# Patient Record
Sex: MALE | Race: BLACK OR AFRICAN AMERICAN | NOT HISPANIC OR LATINO | Employment: FULL TIME | URBAN - METROPOLITAN AREA
[De-identification: names, ages, dates, MRNs, and addresses within clinical notes are randomized per-mention and may not be internally consistent; named-entity substitution may affect disease eponyms.]

---

## 2019-06-02 ENCOUNTER — OFFICE VISIT (OUTPATIENT)
Dept: OPHTHALMOLOGY | Facility: CLINIC | Age: 39
End: 2019-06-02
Payer: COMMERCIAL

## 2019-06-02 DIAGNOSIS — H11.422 CHEMOSIS OF CONJUNCTIVA, LEFT: ICD-10-CM

## 2019-06-02 DIAGNOSIS — H20.9 TRAUMATIC IRITIS: ICD-10-CM

## 2019-06-02 DIAGNOSIS — H40.52X1 SECONDARY GLAUCOMA OF LEFT EYE, MILD STAGE: ICD-10-CM

## 2019-06-02 DIAGNOSIS — S05.12XA TRAUMATIC ORBITAL HEMATOMA, LEFT, INITIAL ENCOUNTER: Primary | ICD-10-CM

## 2019-06-02 PROCEDURE — 92004 COMPRE OPH EXAM NEW PT 1/>: CPT | Performed by: OPHTHALMOLOGY
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
## 2019-06-04 ENCOUNTER — OFFICE VISIT (OUTPATIENT)
Dept: OPHTHALMOLOGY | Facility: CLINIC | Age: 39
End: 2019-06-04
Payer: COMMERCIAL

## 2019-06-04 DIAGNOSIS — S05.12XD: Primary | ICD-10-CM

## 2019-06-04 PROCEDURE — 92012 INTRM OPH EXAM EST PATIENT: CPT | Performed by: OPHTHALMOLOGY

## 2019-06-04 RX ORDER — PREDNISOLONE ACETATE 10 MG/ML
1 SUSPENSION/ DROPS OPHTHALMIC 4 TIMES DAILY
COMMUNITY
Start: 2019-06-02 | End: 2019-06-18

## 2019-06-04 RX ORDER — ACETAZOLAMIDE 250 MG/1
250 TABLET ORAL 3 TIMES DAILY
COMMUNITY
Start: 2019-06-01 | End: 2019-06-10

## 2019-06-04 RX ORDER — DORZOLAMIDE HYDROCHLORIDE AND TIMOLOL MALEATE 20; 5 MG/ML; MG/ML
1 SOLUTION/ DROPS OPHTHALMIC 2 TIMES DAILY
COMMUNITY
Start: 2019-06-01 | End: 2019-06-18

## 2019-06-04 RX ORDER — BRIMONIDINE TARTRATE 1.5 MG/ML
1 SOLUTION/ DROPS OPHTHALMIC 2 TIMES DAILY
COMMUNITY
Start: 2019-06-01 | End: 2019-06-18

## 2019-06-04 ASSESSMENT — VISUAL ACUITY
METHOD: SNELLEN - LINEAR
OD_SC: 20/30
CORRECTION_TYPE: GLASSES
OS_SC: 20/50

## 2019-06-04 ASSESSMENT — TONOMETRY
OS_IOP_MMHG: 15
IOP_METHOD: APPLANATION

## 2019-06-04 ASSESSMENT — CUP TO DISC RATIO: OS_RATIO: 0.6
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
## 2019-06-04 NOTE — PATIENT INSTRUCTIONS
Discontinue Diamox pills.    Continue Tobrex ointment to incision site and left eyelid three times daily,  Brimonidine (purple top) left eye twice daily,  Dorzolamide/Timolol (Cosopt - dark blue top) left eye twice daily,  Homatropine left eye three times daily,   And Prednisolone left eye only four times daily.  Wait at least 5 minutes between drops if using more than one at a time.  We will contact our Occuloplastics Surgeon and discuss follow up care - will call with this information.  Driving is ok, but be careful as your depth perception will be off during healing.   Use cold packs on the eyes as needed.  Return visit next Monday 6/10/19 for MD check.    Negrito Brown M.D.  [phone]
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
## 2019-06-04 NOTE — Clinical Note
Devonte Faust,This patient had a lateral canthotomy performed by an ER doc at [place] for tense orbital swelling, vision loss, and elevated IOP.  His orbital swelling is resolving and I'd appreciate if you could see him in the next few days to assess for reconstruction of his canthal angle and revision of placed sutures if necessary.I told the patient I'd contact him after I hear from you.Thanks so much!Al

## 2019-06-05 ASSESSMENT — EXTERNAL EXAM - RIGHT EYE: OD_EXAM: NORMAL

## 2019-06-05 ASSESSMENT — SLIT LAMP EXAM - LIDS: COMMENTS: NORMAL

## 2019-06-10 ENCOUNTER — OFFICE VISIT (OUTPATIENT)
Dept: OPHTHALMOLOGY | Facility: CLINIC | Age: 39
End: 2019-06-10
Payer: COMMERCIAL

## 2019-06-10 DIAGNOSIS — S05.12XD: Primary | ICD-10-CM

## 2019-06-10 DIAGNOSIS — H20.9 TRAUMATIC IRITIS: ICD-10-CM

## 2019-06-10 DIAGNOSIS — H11.422 CHEMOSIS OF CONJUNCTIVA, LEFT: ICD-10-CM

## 2019-06-10 PROCEDURE — 67875 CLOSURE OF EYELID BY SUTURE: CPT | Mod: LT | Performed by: OPHTHALMOLOGY

## 2019-06-10 PROCEDURE — 92012 INTRM OPH EXAM EST PATIENT: CPT | Performed by: OPHTHALMOLOGY

## 2019-06-10 RX ORDER — ERGOCALCIFEROL 1.25 MG/1
50000 CAPSULE, LIQUID FILLED ORAL
COMMUNITY
Start: 2019-05-02 | End: 2019-07-30

## 2019-06-10 RX ORDER — CYCLOBENZAPRINE HCL 10 MG
TABLET ORAL
COMMUNITY
Start: 2018-07-09

## 2019-06-10 RX ORDER — INSULIN PUMP SYRINGE, 3 ML
EACH MISCELLANEOUS
COMMUNITY
Start: 2017-06-21

## 2019-06-10 RX ORDER — ATORVASTATIN CALCIUM 10 MG/1
10 TABLET, FILM COATED ORAL
COMMUNITY
Start: 2018-12-10

## 2019-06-10 RX ORDER — OXYCODONE AND ACETAMINOPHEN 5; 325 MG/1; MG/1
1-2 TABLET ORAL
COMMUNITY
Start: 2019-06-01

## 2019-06-10 ASSESSMENT — VISUAL ACUITY
OS_SC+: -2
OD_SC+: -1
METHOD: SNELLEN - LINEAR
OS_SC: 20/60
OS_SC+: -2
OS_SC: 20/70
METHOD: SNELLEN - LINEAR
OD_SC: 20/50
OD_SC+: -1
OD_SC: 20/50

## 2019-06-10 ASSESSMENT — TONOMETRY
OS_IOP_MMHG: 26
IOP_METHOD: ICARE
IOP_METHOD: APPLANATION
OD_IOP_MMHG: 21
OS_IOP_MMHG: 24

## 2019-06-10 ASSESSMENT — SLIT LAMP EXAM - LIDS
COMMENTS: NORMAL
COMMENTS: NORMAL

## 2019-06-10 ASSESSMENT — CUP TO DISC RATIO: OS_RATIO: 0.6

## 2019-06-10 ASSESSMENT — EXTERNAL EXAM - RIGHT EYE
OD_EXAM: NORMAL
OD_EXAM: NORMAL

## 2019-06-10 NOTE — LETTER
6/10/2019         RE: Nils Lantigua  [address] [facility] [address]        Dear Colleague,    Thank you for referring your patient, Nils Lantigua, to the [facility]. Please see a copy of my visit note below.     Current Eye Medications:  Tobrex ointment to incision site and left eyelid three times daily-(ran out two days ago),  Brimonidine left eye twice daily,  Dorzolamide/Timolol left eye twice daily,  Homatropine left eye three times daily,  Prednisolone left eye only four times daily.     Subjective:  Here for recheck. Friday the conjunctiva in the left eye started to protrude, getting worse ever since. Pain is 10/10 this am, took Oxycodone. Getting very constipated, not on any stool softener.  Has paperwork to be signed, works 64 hours in two weeks.     Objective:  See Ophthalmology Exam.       Assessment:  Worsening prolapsing conjunctiva left eye in patient with hx of traumatic injury.      Plan:  Continue using Brimonidine (purple top) left eye twice daily,  Dorzolamide/Timolol (Cosopt - dark blue top) left eye twice daily,  Homatropine left eye three times daily,  Prednisolone left eye four times daily,  Consult with Dr. Govind Torres regarding Tobrex ointment and returning to work at today's visit.  Will contact you for recommend return visit after consulting with Dr. Govind Torres.     Negrito Brown M.D.  [phone]           Again, thank you for allowing me to participate in the care of your patient.        Sincerely,        Negrito Brown MD
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
## 2019-06-10 NOTE — PATIENT INSTRUCTIONS
Continue using Brimonidine (purple top) left eye twice daily,  Dorzolamide/Timolol (Cosopt - dark blue top) left eye twice daily,  Homatropine left eye three times daily,  Prednisolone left eye four times daily,  Consult with Dr. Govind Torres regarding Tobrex ointment and returning to work at today's visit.  Will contact you for recommend return visit after consulting with Dr. Govind Torres.     Negrito Brown M.D.  [phone]

## 2019-06-10 NOTE — PROGRESS NOTES
Current Eye Medications:  Tobrex ointment to incision site and left eyelid three times daily-(ran out two days ago),  Brimonidine left eye twice daily,  Dorzolamide/Timolol left eye twice daily,  Homatropine left eye three times daily,  Prednisolone left eye only four times daily.     Subjective:  Here for recheck. Friday the conjunctiva in the left eye started to protrude, getting worse ever since. Pain is 10/10 this am, took Oxycodone. Getting very constipated, not on any stool softener.  Has paperwork to be signed, works 64 hours in two weeks.     Objective:  See Ophthalmology Exam.       Assessment:  Worsening prolapsing conjunctiva left eye in patient with hx of traumatic injury.      Plan:  Continue using Brimonidine (purple top) left eye twice daily,  Dorzolamide/Timolol (Cosopt - dark blue top) left eye twice daily,  Homatropine left eye three times daily,  Prednisolone left eye four times daily,  Consult with Dr. Govind Torres regarding Tobrex ointment and returning to work at today's visit.  Will contact you for recommend return visit after consulting with Dr. Govind Torres.     Negrito Brown M.D.  [phone]

## 2019-06-10 NOTE — Clinical Note
Devonte Faust,His chemosis has worsened since his visit last week.  He is having more pain and difficulty instilling drops and ointment.  Still has a significant iritis.Hope you can help...not sure if you need to take down sutures at this point.Thanks!Negrito

## 2019-06-17 ENCOUNTER — TELEPHONE (OUTPATIENT)
Dept: OPHTHALMOLOGY | Facility: CLINIC | Age: 39
End: 2019-06-17

## 2019-06-17 NOTE — TELEPHONE ENCOUNTER
Phone call to pt, [place] Torres [place] [facility]  Madeline Sylvester RN

## 2019-06-17 NOTE — TELEPHONE ENCOUNTER
Caller reporting the following red-flag symptom(s): Patient, Nils Lantigua     Recent surgery on left eye. Scheduled this wed for removal of stitches, but patient said stitches are painful currently and wants to be seen sooner. Advised dr darling not in office today and no other physician can see. Pt also instructed to contact the [facility] as dr darling is in office tomorrow, Tuesday, but patient reluctant to contact as he wants to be advised since its painful today.    Per the system red-flag symptom policy, patient was:  Instructed to speak with a Registered Nurse and refuses to speak with a triage nurse and wants a message sent to their provider.    Tried contacting specialty eye dept with no answer so was advised to send pool message to dept since dr darling is not working today and is not in [facility] tomorrow. Please call pt to advise.    Action:  Message sent to the clinic.

## 2019-06-18 ENCOUNTER — OFFICE VISIT (OUTPATIENT)
Dept: OPHTHALMOLOGY | Facility: CLINIC | Age: 39
End: 2019-06-18
Payer: COMMERCIAL

## 2019-06-18 DIAGNOSIS — S05.12XD: ICD-10-CM

## 2019-06-18 DIAGNOSIS — H40.052 ELEVATED IOP, LEFT: ICD-10-CM

## 2019-06-18 DIAGNOSIS — H11.422 CHEMOSIS OF CONJUNCTIVA, LEFT: Primary | ICD-10-CM

## 2019-06-18 DIAGNOSIS — H20.9 TRAUMATIC IRITIS: ICD-10-CM

## 2019-06-18 RX ORDER — BRIMONIDINE TARTRATE 1.5 MG/ML
1 SOLUTION/ DROPS OPHTHALMIC 2 TIMES DAILY
Qty: 1 BOTTLE | Refills: 3 | Status: SHIPPED | OUTPATIENT
Start: 2019-06-18

## 2019-06-18 RX ORDER — DORZOLAMIDE HYDROCHLORIDE AND TIMOLOL MALEATE 20; 5 MG/ML; MG/ML
1 SOLUTION/ DROPS OPHTHALMIC 2 TIMES DAILY
Qty: 1 BOTTLE | Refills: 3 | Status: SHIPPED | OUTPATIENT
Start: 2019-06-18

## 2019-06-18 ASSESSMENT — EXTERNAL EXAM - RIGHT EYE: OD_EXAM: NORMAL

## 2019-06-18 ASSESSMENT — VISUAL ACUITY
OS_SC+: -1
OD_SC+: +1
OS_SC: 20/30
METHOD: SNELLEN - LINEAR
OD_SC: 20/50

## 2019-06-18 ASSESSMENT — TONOMETRY
OD_IOP_MMHG: 19
OS_IOP_MMHG: XX
IOP_METHOD: ICARE

## 2019-06-18 ASSESSMENT — SLIT LAMP EXAM - LIDS: COMMENTS: NORMAL

## 2019-06-18 NOTE — NURSING NOTE
Chief Complaints and History of Present Illnesses   Patient presents with     Post Op (Ophthalmology) Left Eye     POD#8 s/p Left temporary tarsorrhaphy     Chief Complaint(s) and History of Present Illness(es)     Post Op (Ophthalmology) Left Eye     Laterality: left eye    Associated symptoms: tearing.  Negative for eye pain    Comments: POD#8 s/p Left temporary tarsorrhaphy              Comments     Patient notes that the LE he had a HA from his stitches, took tylenol for relief    Deana Ramsey June 18, 2019 9:20 AM
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
## 2019-06-18 NOTE — Clinical Note
Hi Al, he looks significantly better from a chemosis standpoint - still some left, but can easily close his eyes now. His intraocular pressure is moderately elevated (mid 30s), he didn't use his drops this morning. Will restart those drops, back off a bit on steroid (iritis looks better). I will see him back in 2 weeks to see what if anything needs to be done for his lateral canthus (my guess is nothing). I asked him to return to you for management of iritis and pressure checks. Thanks!

## 2019-06-27 ENCOUNTER — OFFICE VISIT (OUTPATIENT)
Dept: OPHTHALMOLOGY | Facility: CLINIC | Age: 39
End: 2019-06-27
Payer: COMMERCIAL

## 2019-06-27 DIAGNOSIS — S05.12XD: Primary | ICD-10-CM

## 2019-06-27 DIAGNOSIS — H20.9 TRAUMATIC IRITIS: ICD-10-CM

## 2019-06-27 DIAGNOSIS — H40.52X1 SECONDARY GLAUCOMA OF LEFT EYE, MILD STAGE: ICD-10-CM

## 2019-06-27 DIAGNOSIS — H11.422 CHEMOSIS OF CONJUNCTIVA, LEFT: ICD-10-CM

## 2019-06-27 PROCEDURE — 92012 INTRM OPH EXAM EST PATIENT: CPT | Performed by: OPHTHALMOLOGY

## 2019-06-27 RX ORDER — PREDNISOLONE ACETATE 10 MG/ML
SUSPENSION/ DROPS OPHTHALMIC
COMMUNITY
Start: 2019-06-19

## 2019-06-27 ASSESSMENT — TONOMETRY
IOP_METHOD: TONOPEN
OS_IOP_MMHG: 40

## 2019-06-27 ASSESSMENT — SLIT LAMP EXAM - LIDS: COMMENTS: NORMAL

## 2019-06-27 ASSESSMENT — EXTERNAL EXAM - RIGHT EYE: OD_EXAM: NORMAL

## 2019-06-27 ASSESSMENT — VISUAL ACUITY
OD_SC: 20/50
OS_SC: 20/30
OD_SC+: -1
METHOD: SNELLEN - LINEAR

## 2019-06-27 NOTE — PATIENT INSTRUCTIONS
Brimonidine three times daily left eye.    Cosopt twice daily left eye.   Prednisolone three times daily left eye.  Tobradex ointment at bedtime left eye.  Return visit one week for intraocular pressure check.    Negrito Brown M.D.  [phone]
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
## 2019-06-27 NOTE — LETTER
6/27/2019         RE: Nils Lnatigua  [address] [facility] [address]        Dear Colleague,    Thank you for referring your patient, Nils Lantigua, to the [facility]. Please see a copy of my visit note below.     Current Eye Medications:  Brimonidine (last used 8:00 pm yesterday) and Cosopt (last used 8 pm yesterday) BID; Pt thinks maybe he uses his drops 80% of the time.  Pred qid - last used 9:30 pm last night). Pt says he is consistent with using drop.     Tobradex ointment 10:30 pm last night.       Subjective:  Nils Lantigua is a 38 year old male with the following findings at visit with Dr. ANNMARIE Sanches on 6/18/2019.    1. Chemosis of conjunctiva, left    2. Traumatic orbital hematoma, left, subsequent encounter    3. Traumatic iritis    4. Elevated IOP, left      Pt says left eye is getting better, but still painful.  Pt eye is very watery and red.     Objective:  See Ophthalmology Exam.       Assessment:  Intraocular pressure up today; chemosis improving.      Plan:  Brimonidine three times daily left eye.    Cosopt twice daily left eye.   Prednisolone three times daily left eye.  Tobradex ointment at bedtime left eye.  Return visit one week for intraocular pressure check.    Negrito Brown M.D.  [phone]           Again, thank you for allowing me to participate in the care of your patient.        Sincerely,        Negrito Brown MD

## 2019-07-02 ENCOUNTER — TELEPHONE (OUTPATIENT)
Dept: OPHTHALMOLOGY | Facility: CLINIC | Age: 39
End: 2019-07-02

## 2019-07-02 NOTE — TELEPHONE ENCOUNTER
Patient is calling regarding his leave of absence from work after his eye injury. Patient states that he gave a paper to Dr. Brown at his last visit that included a fax number to send it to. Patient states the form needs to be received today in order for his leave of absence to be approved.

## 2019-07-03 ENCOUNTER — OFFICE VISIT (OUTPATIENT)
Dept: OPHTHALMOLOGY | Facility: CLINIC | Age: 39
End: 2019-07-03
Payer: COMMERCIAL

## 2019-07-03 DIAGNOSIS — H40.052 ELEVATED IOP, LEFT: ICD-10-CM

## 2019-07-03 DIAGNOSIS — H20.9 TRAUMATIC IRITIS: ICD-10-CM

## 2019-07-03 DIAGNOSIS — H11.422 CHEMOSIS OF CONJUNCTIVA, LEFT: ICD-10-CM

## 2019-07-03 DIAGNOSIS — S05.12XD: Primary | ICD-10-CM

## 2019-07-03 PROCEDURE — 92012 INTRM OPH EXAM EST PATIENT: CPT | Performed by: OPHTHALMOLOGY

## 2019-07-03 ASSESSMENT — VISUAL ACUITY
OS_SC: 20/30
OS_PH_SC: NO
CORRECTION_TYPE: GLASSES
METHOD: SNELLEN - LINEAR
OD_SC: 20/50-2
OD_PH_SC: 20/30

## 2019-07-03 ASSESSMENT — EXTERNAL EXAM - RIGHT EYE: OD_EXAM: NORMAL

## 2019-07-03 ASSESSMENT — SLIT LAMP EXAM - LIDS: COMMENTS: NORMAL

## 2019-07-03 ASSESSMENT — TONOMETRY
OD_IOP_MMHG: 20
OS_IOP_MMHG: 28
IOP_METHOD: APPLANATION

## 2019-07-03 NOTE — PATIENT INSTRUCTIONS
Continue Brimonidine three times daily left eye.    Cosopt twice daily left eye.   Tobradex ointment at bedtime left eye.  Prednisolone three times daily left eye.  Ok to return to work 7-4-19  Return visit 3 weeks for intraocular pressure check.    Negrito Brown M.D.  [phone]

## 2019-07-03 NOTE — PROGRESS NOTES
Current Eye Medications:  Brimonidine three times daily left eye.    Cosopt twice daily left eye.   Tobradex ointment at bedtime left eye  Prednisolone three times daily left eye      Subjective:  1 wk iop recheck  Pt report his left feel better now, however does water a lot Last drops at 7:45.     Objective:  See Ophthalmology Exam.       Assessment:  Intraocular pressure and chemosis improved left eye; persistent mild iritis.      Plan:  Continue Brimonidine three times daily left eye.    Cosopt twice daily left eye.   Tobradex ointment at bedtime left eye.  Prednisolone three times daily left eye.  Ok to return to work 7-4-19  Return visit 3 weeks for intraocular pressure check.    Negrito Brown M.D.  [phone]

## 2019-07-03 NOTE — LETTER
7/3/2019         RE: Nils Lantigua  [address] [facility] [address]        Dear Colleague,    Thank you for referring your patient, Nils Lantigua, to the [facility]. Please see a copy of my visit note below.     Current Eye Medications:  Brimonidine three times daily left eye.    Cosopt twice daily left eye.   Tobradex ointment at bedtime left eye  Prednisolone three times daily left eye      Subjective:  1 wk iop recheck  Pt report his left feel better now, however does water a lot Last drops at 7:45.     Objective:  See Ophthalmology Exam.       Assessment:  Intraocular pressure and chemosis improved left eye; persistent mild iritis.      Plan:  Continue Brimonidine three times daily left eye.    Cosopt twice daily left eye.   Tobradex ointment at bedtime left eye.  Prednisolone three times daily left eye.  Ok to return to work 7-4-19  Return visit 3 weeks for intraocular pressure check.    Negrito Brown M.D.  [phone]           Again, thank you for allowing me to participate in the care of your patient.        Sincerely,        Negrito Brown MD

## 2019-07-27 PROBLEM — H11.422: Status: ACTIVE | Noted: 2019-07-27

## 2019-07-27 PROBLEM — H20.9 TRAUMATIC IRITIS: Status: ACTIVE | Noted: 2019-07-27

## 2019-07-27 PROBLEM — H40.52X1: Status: ACTIVE | Noted: 2019-07-27

## 2019-07-27 PROBLEM — S05.12XD: Status: ACTIVE | Noted: 2019-07-27

## 2019-11-03 ENCOUNTER — HEALTH MAINTENANCE LETTER (OUTPATIENT)
Age: 39
End: 2019-11-03

## 2019-11-10 ASSESSMENT — CUP TO DISC RATIO: OS_RATIO: 0.2

## 2019-11-10 ASSESSMENT — VISUAL ACUITY
OD_SC+: -2
OS_SC+: -2
OD_SC: 20/40
OS_SC: 20/40
METHOD: SNELLEN - LINEAR

## 2019-11-10 ASSESSMENT — EXTERNAL EXAM - RIGHT EYE: OD_EXAM: NORMAL

## 2019-11-10 ASSESSMENT — CONF VISUAL FIELD: OD_NORMAL: 1

## 2019-11-10 ASSESSMENT — SLIT LAMP EXAM - LIDS: COMMENTS: NORMAL

## 2019-11-10 ASSESSMENT — TONOMETRY
OS_IOP_MMHG: 15
IOP_METHOD: APPLANATION

## 2019-11-10 NOTE — PROGRESS NOTES
Current Eye Medications:  See list in plan below     Subjective:  Continued pain, decreased vision, redness, watering left eye.     Objective:  See Ophthalmology Exam.       Assessment:  Blunt trauma injury to left eye and orbit.  Had lateral canthotomy in ER, with subsequent repair yesterday.      Plan:  Continue Homatropine three times daily, Prednisolone four times daily, Brimonidine twice daily, Dorzolamide/Timolol twice daily, Tobrex ointment three times daily, all left eye.  Diamox 250 mg three times daily.  Shield.  Return visit Tuesday 12:45 PM.  Negrito Brown M.D.  [phone]

## 2019-11-10 NOTE — PATIENT INSTRUCTIONS
Continue Homatropine three times daily, Prednisolone four times daily, Brimonidine twice daily, Dorzolamide/Timolol twice daily, Tobrex ointment three times daily, all left eye.  Diamox 250 mg three times daily.  Shield.  Return visit Tuesday 12:45 PM.  Negrito Brown M.D.  [phone]

## 2020-11-16 ENCOUNTER — HEALTH MAINTENANCE LETTER (OUTPATIENT)
Age: 40
End: 2020-11-16

## 2021-09-18 ENCOUNTER — HEALTH MAINTENANCE LETTER (OUTPATIENT)
Age: 41
End: 2021-09-18

## 2022-01-08 ENCOUNTER — HEALTH MAINTENANCE LETTER (OUTPATIENT)
Age: 42
End: 2022-01-08

## 2022-11-19 ENCOUNTER — HEALTH MAINTENANCE LETTER (OUTPATIENT)
Age: 42
End: 2022-11-19

## 2023-04-09 ENCOUNTER — HEALTH MAINTENANCE LETTER (OUTPATIENT)
Age: 43
End: 2023-04-09

## 2023-10-23 ENCOUNTER — TELEPHONE (OUTPATIENT)
Dept: OPHTHALMOLOGY | Facility: CLINIC | Age: 43
End: 2023-10-23
Payer: COMMERCIAL

## 2023-10-23 NOTE — TELEPHONE ENCOUNTER
Caller reporting the following red-flag symptom(s): Pain eye left eye when pt looks to either side back of eye pain, comes and goes, few weeks ago, left eye surgery DOS:2018 at [facility] in      Per the system red-flag symptom policy, patient was instructed to:  speak with a Registered Nurse    Action:  Patient warm transferred to a Registered Nurse [place]      Spoke with caller to let him know I can get information and let clinic know what's going on and they will try to work him in earlier   He said that he just wants an appt scheduled with Dr Byrnes next month  He denies vision issues  Has some eye pain but its not urgent he said   He hung up and said he would call back to schedule an appt in November

## 2023-11-21 ENCOUNTER — NURSE TRIAGE (OUTPATIENT)
Dept: NURSING | Facility: CLINIC | Age: 43
End: 2023-11-21

## 2023-11-21 ENCOUNTER — TELEPHONE (OUTPATIENT)
Dept: OPHTHALMOLOGY | Facility: CLINIC | Age: 43
End: 2023-11-21
Payer: COMMERCIAL

## 2023-11-21 NOTE — TELEPHONE ENCOUNTER
Caller reporting the following red-flag symptom(s): Lft eye pain     Per the system red-flag symptom policy, patient was instructed to:  speak with a Registered Nurse    Action:  Patient warm transferred to a Registered Nurse

## 2023-11-21 NOTE — TELEPHONE ENCOUNTER
Nurse Triage SBAR    Is this a 2nd Level Triage?   Yes    Situation:  Left eye pain     Background/Assessment:     Pt reporting, left eye pain for the last 4 days.     Pt woke up a few days ago.  With sneezing, coughing and a runny nose.    Once that went away.  Pt woke up with left eye pain.     The left eye is very sensitive to light and watery.   No injuries, floaters or flashes of light noted.    Pt uses cheater glasses to read.   But does not have regular prescription for glasses or contacts.    Pt requesting an eye appointment for Pt care.    Please call Pt back @ [phone] for further assistance.    Thank you     Leatha Cherry RN  Central Triage Red Flags/Med Refills      Protocol Recommended Disposition:   See Today In Office      Reason for Disposition   Patient wants to be seen    Additional Information   Negative: Followed an eye injury   Negative: Eye pain from chemical in the eye   Negative: Eye pain from foreign body in eye   Negative: Has sinus pain or pressure   Negative: Severe eye pain   Negative: Complete loss of vision in one or both eyes   Negative: Eyelids are very swollen (shut or almost) and fever   Negative: Eyelid (outer) is very red and fever   Negative: Foreign body sensation ('feels like something is in there') and irrigation didn't help   Negative: Vomiting   Negative: Ulcer or sore seen on the cornea (clear center part of the eye)   Negative: Recent eye surgery and increasing eye pain   Negative: Blurred vision and new or worsening   Negative: Patient sounds very sick or weak to the triager   Negative: Eye pain/discomfort and more than mild   Negative: Eyelids are very swollen (shut or almost) and no fever   Negative: Painful rash near eye and multiple small blisters grouped together   Negative: Pain followed bright light exposure from welding   Negative: Looking at light causes severe pain (i.e., photophobia)   Negative: Yellow or green pus occurs   Negative: Eye pain present > 24  hours    Protocols used: Eye Pain-A-[place]

## 2023-11-21 NOTE — TELEPHONE ENCOUNTER
I spoke to the patient who reports left eye pain for four days.  He notes he had this last month as well.  I offered the patient an appointment for tomorrow but he cannot make that.  He is wondering if he can see his provider next week.  He may also go to another clinic that works out with his schedule.  Note sent to [facility] for scheduling patient.

## 2023-11-24 NOTE — TELEPHONE ENCOUNTER
Called patient back. He has been having left eye for about a month now. Had a prior history of surgery with Dr. Brown in 2018. He would rather be seen in the mornings. Will put him down for Dec 4 at 8:20 am for an appointment.

## 2024-06-16 ENCOUNTER — HEALTH MAINTENANCE LETTER (OUTPATIENT)
Age: 44
End: 2024-06-16

## 2024-07-12 ENCOUNTER — TELEPHONE (OUTPATIENT)
Dept: ADMINISTRATIVE | Facility: OTHER | Age: 44
End: 2024-07-12

## 2024-07-12 NOTE — TELEPHONE ENCOUNTER
"Called pt and after 3rd attempt LMOM to callback clinic to relay to pt that we can only do rudimentary vision check and do have the capability for eye exam at clinic where he has appointment on 7/15. Also sent mychart relaying message that appointment will be cancelled and pt will need to call scheduling and request a \"eye provider\" to have a comprehensive eye exam.    Alfredo Zepeda RN   [facility]    "